# Patient Record
Sex: MALE
[De-identification: names, ages, dates, MRNs, and addresses within clinical notes are randomized per-mention and may not be internally consistent; named-entity substitution may affect disease eponyms.]

---

## 2020-04-14 ENCOUNTER — HOSPITAL ENCOUNTER (EMERGENCY)
Dept: HOSPITAL 88 - ER | Age: 20
Discharge: HOME | End: 2020-04-14
Payer: SELF-PAY

## 2020-04-14 VITALS — BODY MASS INDEX: 32.58 KG/M2 | HEIGHT: 69 IN | WEIGHT: 220 LBS

## 2020-04-14 VITALS — DIASTOLIC BLOOD PRESSURE: 63 MMHG | SYSTOLIC BLOOD PRESSURE: 125 MMHG

## 2020-04-14 DIAGNOSIS — G40.401: Primary | ICD-10-CM

## 2020-04-14 LAB
ALBUMIN SERPL-MCNC: 4.2 G/DL (ref 3.5–5)
ALBUMIN/GLOB SERPL: 1.3 {RATIO} (ref 0.8–2)
ALP SERPL-CCNC: 73 IU/L (ref 40–150)
ALT SERPL-CCNC: 39 IU/L (ref 0–55)
ANION GAP SERPL CALC-SCNC: 14.1 MMOL/L (ref 8–16)
BASOPHILS # BLD AUTO: 0 10*3/UL (ref 0–0.1)
BASOPHILS NFR BLD AUTO: 0.1 % (ref 0–1)
BUN SERPL-MCNC: 15 MG/DL (ref 7–26)
BUN/CREAT SERPL: 14 (ref 6–25)
CALCIUM SERPL-MCNC: 8.7 MG/DL (ref 8.4–10.2)
CHLORIDE SERPL-SCNC: 108 MMOL/L (ref 98–107)
CK MB SERPL-MCNC: 5 NG/ML (ref 0–5)
CK SERPL-CCNC: 365 IU/L (ref 30–200)
CO2 SERPL-SCNC: 20 MMOL/L (ref 22–29)
DEPRECATED NEUTROPHILS # BLD AUTO: 11.3 10*3/UL (ref 2.1–6.9)
EGFRCR SERPLBLD CKD-EPI 2021: > 60 ML/MIN (ref 60–?)
EOSINOPHIL # BLD AUTO: 0 10*3/UL (ref 0–0.4)
EOSINOPHIL NFR BLD AUTO: 0.2 % (ref 0–6)
ERYTHROCYTE [DISTWIDTH] IN CORD BLOOD: 12.7 % (ref 11.7–14.4)
GLOBULIN PLAS-MCNC: 3.2 G/DL (ref 2.3–3.5)
GLUCOSE SERPLBLD-MCNC: 87 MG/DL (ref 74–118)
HCT VFR BLD AUTO: 44 % (ref 38.2–49.6)
HGB BLD-MCNC: 15.3 G/DL (ref 14–18)
LYMPHOCYTES # BLD: 1.6 10*3/UL (ref 1–3.2)
LYMPHOCYTES NFR BLD AUTO: 11.6 % (ref 18–39.1)
MCH RBC QN AUTO: 29.8 PG (ref 28–32)
MCHC RBC AUTO-ENTMCNC: 34.8 G/DL (ref 31–35)
MCV RBC AUTO: 85.8 FL (ref 81–99)
MONOCYTES # BLD AUTO: 0.9 10*3/UL (ref 0.2–0.8)
MONOCYTES NFR BLD AUTO: 6.1 % (ref 4.4–11.3)
NEUTS SEG NFR BLD AUTO: 81.4 % (ref 38.7–80)
PLATELET # BLD AUTO: 329 X10E3/UL (ref 140–360)
POTASSIUM SERPL-SCNC: 4.1 MMOL/L (ref 3.5–5.1)
RBC # BLD AUTO: 5.13 X10E6/UL (ref 4.3–5.7)
SODIUM SERPL-SCNC: 138 MMOL/L (ref 136–145)

## 2020-04-14 PROCEDURE — 80320 DRUG SCREEN QUANTALCOHOLS: CPT

## 2020-04-14 PROCEDURE — 71045 X-RAY EXAM CHEST 1 VIEW: CPT

## 2020-04-14 PROCEDURE — 80053 COMPREHEN METABOLIC PANEL: CPT

## 2020-04-14 PROCEDURE — 99284 EMERGENCY DEPT VISIT MOD MDM: CPT

## 2020-04-14 PROCEDURE — 82550 ASSAY OF CK (CPK): CPT

## 2020-04-14 PROCEDURE — 84484 ASSAY OF TROPONIN QUANT: CPT

## 2020-04-14 PROCEDURE — 70450 CT HEAD/BRAIN W/O DYE: CPT

## 2020-04-14 PROCEDURE — 85025 COMPLETE CBC W/AUTO DIFF WBC: CPT

## 2020-04-14 PROCEDURE — 36415 COLL VENOUS BLD VENIPUNCTURE: CPT

## 2020-04-14 PROCEDURE — 93005 ELECTROCARDIOGRAM TRACING: CPT

## 2020-04-14 PROCEDURE — 82553 CREATINE MB FRACTION: CPT

## 2020-04-14 NOTE — XMS REPORT
Patient Summary Document

                             Created on: 2020



ELEONORA WANG

External Reference #: [id number]

: 2000

Sex: Male



Demographics







                          Address                   [address] [place]  [address]

 

                          Home Phone                [phone]

 

                          Preferred Language        Unknown

 

                          Marital Status            Unknown

 

                          Congregational Affiliation     Unknown

 

                          Race                      Unknown

 

                          Ethnic Group              Unknown





Author







                          Author                    [facility]

 

                          [facility]

 

                          Address                   Unknown

 

                          Phone                     Unavailable







Support







                Name            Relationship    Address         Phone

 

                    ROXANA VENEGAS      PRS                 [address], [place]  [address] [phone]

 

                    ROXANA VENEGAS      PRS                 [address], [place]  [address] [phone]







Care Team Providers







                    Care Team Member Name    Role                Phone

 

                          Unavailable               Unavailable







Payers







             Payer Name    Policy Type    Policy Number    Effective Date    Expiration Date







Problems

This patient has no known problems.



Allergies, Adverse Reactions, Alerts







          Allergy Name    Allergy Type    Status    Severity    Reaction(s)    Onset Date    Inactive 

Date                      Treating Clinician        Comments

 

        No Known Allergies    DA      Active    U               2015 00:00:00                     







Medications

This patient has no known medications.



Results







           Test Description    Test Time    Test Comments    Text Results    Atomic Results    Result

 Comments

 

                DRUGS OF ABUSE SCREEN UR    2020 14:34:00                      

 

   

 

                UA PH DIPSTICK (test code=KHLOE)    6.0             5.0-8.0          

 

                URN COCAINE (test code=COCAURN)    NEGATIVE        <300 ng/mL       

 

                URN CANNABINOIDS (test code=CANNABURN)    POSITIVE        <50 ng/mL       This test provides 

only a preliminary test result.  A morespecific alternate chemical method must 
be used in order toobtain a confirmed analytical result.  Gas 
chromatography/mass spectrometry (GC/MS) is thepreferred confirmatory method.  
Other chemical confirmationmethods are available.  Clinical consideration and 
professional judgment should be applied to any drug of abusetest result, 
particularly when preliminary positive resultsare used.Unconfirmed screening 
results must not be used fornon-medical purposes (e.g., employment testing, 
legaltesting).

 

                URN AMPHETAMINE (test code=AMPHETURN)    NEGATIVE        <1000 ng/mL      

 

                URN BARBITURATE (test code=BARBITURN)    NEGATIVE        <200 ng/mL       

 

                URN BENZODIAZEPINE (test code=BENZOURN)    NEGATIVE        <200 ng/mL       

 

                URN OPIATES (test code=OPIATURN)    NEGATIVE        <300 ng/mL       

 

                URN PHENCYCLIDINE (PCP) (test code=PHENCURN)    NEGATIVE        <25 ng/mL        

 

                URN METHADONE (test code=METHAURN)    NEGATIVE        <300 ng/mL       





DRUGS OF ABUSE SCREEN [id number] 14:32:00* 





                Test Item       Value           Reference Range    Comments

 

                UA PH DIPSTICK (test code=KHLOE)                    5.0-8.0          

 

                URN COCAINE (test code=COCAURN)    NEGATIVE        <300 ng/mL       

 

                URN CANNABINOIDS (test code=CANNABURN)    POSITIVE        <50 ng/mL       This test provides 

only a preliminary test result.  A morespecific alternate chemical method must 
be used in order toobtain a confirmed analytical result.  Gas 
chromatography/mass spectrometry (GC/MS) is thepreferred confirmatory method.  
Other chemical confirmationmethods are available.  Clinical consideration and 
professional judgment should be applied to any drug of abusetest result, 
particularly when preliminary positive resultsare used.Unconfirmed screening 
results must not be used fornon-medical purposes (e.g., employment testing, 
legaltesting).

 

                URN AMPHETAMINE (test code=AMPHETURN)    NEGATIVE        <1000 ng/mL      

 

                URN BARBITURATE (test code=BARBITURN)    NEGATIVE        <200 ng/mL       

 

                URN BENZODIAZEPINE (test code=BENZOURN)    NEGATIVE        <200 ng/mL       

 

                URN OPIATES (test code=OPIATURN)    NEGATIVE        <300 ng/mL       

 

                URN PHENCYCLIDINE (PCP) (test code=PHENCURN)    NEGATIVE        <25 ng/mL        

 

                URN METHADONE (test code=METHAURN)    NEGATIVE        <300 ng/mL       





BASIC METABOLIC [id number] 14:20:00* 





                Test Item       Value           Reference Range    Comments

 

                SODIUM (test code=NA)    138 mmol/L      136-145          

 

                POTASSIUM (test code=K)    4.6 mmol/L      3.5-5.1          

 

                CHLORIDE (test code=CL)    107.0 mmol/L               

 

                CARBON DIOXIDE (test code=CO2)    25.0 mmol/L     21-32            

 

                ANION GAP (test code=GAP)    10.6            10-20            

 

                GLUCOSE (test code=GLU)    103 mg/dL                  

 

                BLOOD UREA NITROGEN (test code=BUN)    19 mg/dL        7-18             

 

                GLOMERULAR FILTRATION RATE (test code=GFR)    > 60 mL/min     >=60            Estimated GFR by 

using Modified MDRD formula.Chronic kidney disease is defined as either kidney 
damageor GFR <60 mL/min/1.73 m2 for >3 months.

 

                CREATININE (test code=CREAT)    1.00 mg/dL      0.7-1.3          

 

                BUN/CREATININE RATIO (test code=BUN/CREA)    19.0            10-20            

 

                CALCIUM (test code=CA)    8.8 mg/dL       8.5-10.1         





CREATINE KINASE (CK)2020 14:20:00* 





                Test Item       Value           Reference Range    Comments

 

                CREATINE KINASE (CK) (test code=CK)    255 IUnit/L                





[id number] 14:20:00* 





                Test Item       Value           Reference Range    Comments

 

                ALCOHOL (test code=ALC)    < 3 mg/dL       0.0-3.0         -----------------INTERPRETIVE DATA 

NOTE:-------------------- POSITIVE SCREENING RESULTS SHOULD BE CONSIDERED 
PRESUMPTIVE.WHEN COLLECTED FOR MEDICAL PURPOSES ONLY.  SPECIMEN WILL NOTBE 
COLLECTED BY CHAIN OF CUSTODY.IF A CONFIRMATION OF POSITIVE RESULTS IS DESIRED, 
ACONFIRMATION TEST MUST BE REQUESTED BY THE PHYSICIAN AT ANADDITIONAL CHARGE TO 
THE PATIENT.





CBC W/O [id number] 14:08:00* 





                Test Item       Value           Reference Range    Comments

 

                WHITE BLOOD CELL (test code=WBC)    11.7 K/mm3      4.5-12.5         

 

                RED BLOOD CELL (test code=RBC)    5.41 mill/mm3    4.0-5.8          

 

                HEMOGLOBIN (test code=HGB)    16.0 gram/dL    13.0-17.5        

 

                HEMATOCRIT (test code=HCT)    47.3 %          42.0-52.0        

 

                MEAN CELL VOLUME (test code=MCV)    87.4 fL         80-98            

 

                MEAN CELL HGB (test code=MCH)    29.6 picogram    27.0-33.0        

 

                MEAN CELL HGB CONCETRATION (test code=MCHC)    33.8 gram/dL    33.0-36.0        

 

                RED CELL DISTRIBUTION WIDTH (test code=RDW)    13.0 %          11.6-16.2        

 

                PLATELET COUNT (test code=PLT)    321 K/mm3       150-450          

 

                MEAN PLATELET VOLUME (test code=MPV)    9.4 fL          6.7-11.0         





BASIC METABOLIC [id number] 14:07:00* 





                Test Item       Value           Reference Range    Comments

 

                SODIUM (test code=NA)    138 mmol/L      136-145          

 

                POTASSIUM (test code=K)    4.6 mmol/L      3.5-5.1          

 

                CHLORIDE (test code=CL)    107.0 mmol/L               

 

                CARBON DIOXIDE (test code=CO2)     mmol/L         21-32            

 

                ANION GAP (test code=GAP)                    10-20            

 

                GLUCOSE (test code=GLU)     mg/dL                     

 

                BLOOD UREA NITROGEN (test code=BUN)     mg/dL          7-18             

 

                GLOMERULAR FILTRATION RATE (test code=GFR)     mL/min         >=60             

 

                CREATININE (test code=CREAT)     mg/dL          0.7-1.3          

 

                BUN/CREATININE RATIO (test code=BUN/CREA)                    10-20            

 

                CALCIUM (test code=CA)     mg/dL          8.5-10.1         





CREATINE KINASE (CK)2020 14:07:00* 





                Test Item       Value           Reference Range    Comments

 

                CREATINE KINASE (CK) (test code=CK)     IUnit/L                   





[id number] 14:07:00* 





                Test Item       Value           Reference Range    Comments

 

                ALCOHOL (test code=ALC)     mg/dL          0-3              





CBC W/O [id number] 14:06:00* 





                Test Item       Value           Reference Range    Comments

 

                WHITE BLOOD CELL (test code=WBC)     K/mm3          4.5-12.5         

 

                RED BLOOD CELL (test code=RBC)     mill/mm3       4.0-5.8          

 

                HEMOGLOBIN (test code=HGB)    16.0 gram/dL    13.0-17.5        

 

                HEMATOCRIT (test code=HCT)    47.3 %          42.0-52.0        

 

                MEAN CELL VOLUME (test code=MCV)     fL             80-98            

 

                MEAN CELL HGB (test code=MCH)     picogram       27.0-33.0        

 

                MEAN CELL HGB CONCETRATION (test code=MCHC)     gram/dL        33.0-36.0        

 

                RED CELL DISTRIBUTION WIDTH (test code=RDW)     %              11.6-16.2        

 

                PLATELET COUNT (test code=PLT)     K/mm3          150-450          

 

                MEAN PLATELET VOLUME (test code=MPV)     fL             6.7-11.0         





- CT HEAD/BRAIN W/O [id number] 13:08:00  Name: ELEONORA WANG               
    [facility]                     : 2000 Age/S: 20  / M         
[address] [facility]                Unit #: [id number]     Loc:               
[place]  [address]              Phys: Robby Ott MD                   
                           Acct: [id number]  Dis Date:               Status: 
PRE ER                                  PHONE #: [phone]     Exam Date: 
2020  1301                     FAX #: [phone]      Reason: Seizure   
                                         EXAMS:                                 
             CPT CODE:      [id number] CT HEAD/BRAIN W/O CONT                    
[id number]                    HISTORY:  Seizure               TECHNIQUE: Noncontrast 
2.5 mm axial CT of the head. Examination       acquired within 24 hours of 
arrival. Automated exposure control for       dose reduction.               
COMPARISON: None               FINDINGS:               No lacerations or 
contusions of the scalp or facial soft tissues.        Calvarium and skull base 
are intact.               No acute hemorrhage. No intracranial mass, mass 
effect, or midline       shift. No effacement of the sulci or grey-white matter 
interface.                No cortical atrophy.  No signs of white matter small-
vessel disease.               No hydrocephalus.. No extra-axial fluid col
lection.                Visualized paranasal sinuses are clear.        Mastoid a
ir cells and middle ear cavities are clear.        Orbital contents are unremark
able.                          IMPRESSION:                   Negative CT head.  
                Location: [facility]          ** Electronically Signed by Joey Jones MD on 2020 at 1308 **                      Reported and signed by: Joey richmond MD         CC: Robby Ott MD; Sona Damon [facility]                     
                                                                           Tech
nologist:Tien Lee RT(R),(MR),(CT);  CTDI:        DLP:        Trnscb Date/Ti
me: 2020 (1308) [id number]                       Orig Print D/T: S: 2020 (0853)      PAGE  1                       Signed Report

## 2020-04-14 NOTE — DIAGNOSTIC IMAGING REPORT
EXAMINATION:  CHEST SINGLE (PORTABLE)    



INDICATION:      Seizure  

^SZ

^[id number]

^[id number] 



COMPARISON:  None

     

FINDINGS:

TUBES and LINES:  None.



LUNGS:  Lungs are well inflated.  Lungs are clear.   There is no evidence of

pneumonia or pulmonary edema.



PLEURA:  No pleural effusion or pneumothorax.



HEART AND MEDIASTINUM:  The cardiomediastinal silhouette is unremarkable.    



BONES AND SOFT TISSUES:  No acute osseous lesion.  Soft tissues are

unremarkable.



UPPER ABDOMEN: No free air under the diaphragm.    



IMPRESSION: 

No acute thoracic abnormality.





Signed by: Dr. Rajendra Mccann M.D. on 4/14/2020 7:01 PM

## 2020-04-14 NOTE — DIAGNOSTIC IMAGING REPORT
Examination: CT BRAIN WO CONTRAST



History:Seizures.

Comparison studies:None



Technique:

Axial images were obtained from the skull base to the vertex.

Coronal and sagittal images reconstructed from the axial data.

Dose modulation, iterative reconstruction, and/or weight based adjustment of

the mA/kV was utilized to reduce the radiation dose to as low as reasonably

achievable. 

Intravenous contrast: None



Findings:



Scalp: No abnormalities.

Bones: No fractures, blastic or lytic lesions.



Brain sulci: Appropriate for age.

Ventricles: Normal in size and configuration. No hydrocephalus.



Extra-axial space:

No abnormalities.



Parenchyma: 

No abnormal densities. 

No masses, hemorrhage, or acute or chronic cortical based vascular insults..



Sellar/suprasellar region: No abnormalities.

Craniocervical junction: Patent foramen magnum. No Chiari one malformation.



Incidental findings: 

None.



Impression:

 

No intracranial abnormalities.



Signed by: Dr. Monica Harmon M.D. on 4/14/2020 7:07 PM

## 2025-01-29 ENCOUNTER — HOSPITAL ENCOUNTER (EMERGENCY)
Dept: HOSPITAL 88 - ER | Age: 25
Discharge: HOME | End: 2025-01-29
Payer: SELF-PAY

## 2025-01-29 VITALS — TEMPERATURE: 100 F | HEART RATE: 110 BPM | OXYGEN SATURATION: 99 %

## 2025-01-29 VITALS — WEIGHT: 212 LBS | BODY MASS INDEX: 33.27 KG/M2 | HEIGHT: 67 IN

## 2025-01-29 DIAGNOSIS — R50.9: Primary | ICD-10-CM

## 2025-01-29 DIAGNOSIS — L05.01: ICD-10-CM

## 2025-01-29 PROCEDURE — 99283 EMERGENCY DEPT VISIT LOW MDM: CPT

## 2025-01-29 PROCEDURE — 10080 I&D PILONIDAL CYST SIMPLE: CPT

## 2025-01-29 RX ADMIN — Medication STA MG: at 19:15

## 2025-01-29 RX ADMIN — LIDOCAINE HYDROCHLORIDE STA ML: 10 INJECTION, SOLUTION INFILTRATION; PERINEURAL at 19:12
